# Patient Record
Sex: MALE | Race: AMERICAN INDIAN OR ALASKA NATIVE
[De-identification: names, ages, dates, MRNs, and addresses within clinical notes are randomized per-mention and may not be internally consistent; named-entity substitution may affect disease eponyms.]

---

## 2020-01-14 LAB
ALBUMIN SERPL-MCNC: 4.2 G/DL (ref 3.9–5)
ALT SERPL-CCNC: 21 UNITS/L (ref 7–56)
BASOPHILS # (AUTO): 0.1 K/MM3 (ref 0–0.1)
BASOPHILS NFR BLD AUTO: 0.9 % (ref 0–1.8)
BUN SERPL-MCNC: 10 MG/DL (ref 9–20)
BUN/CREAT SERPL: 10 %
CALCIUM SERPL-MCNC: 9.3 MG/DL (ref 8.4–10.2)
EOSINOPHIL # BLD AUTO: 0.2 K/MM3 (ref 0–0.4)
EOSINOPHIL NFR BLD AUTO: 3.3 % (ref 0–4.3)
HCT VFR BLD CALC: 43.1 % (ref 35.5–45.6)
HEMOLYSIS INDEX: 14
HGB BLD-MCNC: 14.1 GM/DL (ref 11.8–15.2)
LYMPHOCYTES # BLD AUTO: 2.1 K/MM3 (ref 1.2–5.4)
LYMPHOCYTES NFR BLD AUTO: 28.8 % (ref 13.4–35)
MCHC RBC AUTO-ENTMCNC: 33 % (ref 32–34)
MCV RBC AUTO: 80 FL (ref 84–94)
MONOCYTES # (AUTO): 0.7 K/MM3 (ref 0–0.8)
MONOCYTES % (AUTO): 9.3 % (ref 0–7.3)
PLATELET # BLD: 229 K/MM3 (ref 140–440)
RBC # BLD AUTO: 5.37 M/MM3 (ref 3.65–5.03)

## 2020-01-14 NOTE — ANESTHESIA CONSULTATION
Anesthesia Consult and Med Hx


Date of service: 01/15/20





- Airway


Anesthetic Teeth Evaluation: Chipped


ROM Head & Neck: Adequate


Mental/Hyoid Distance: Adequate


Mallampati Class: Class II


Intubation Access Assessment: Probably Good





- Pre-Operative Health Status


ASA Pre-Surgery Classification: ASA3


Proposed Anesthetic Plan: General





- Pulmonary


Hx Smoking: Yes (1/3 PPD X 30 YRS)


Hx Sleep Apnea: Yes (DX SLEEP APNEA WITH CPAP USE.)





- Cardiovascular System


Hx Hypertension: Yes (X 12 YRS. States he can climb two flights of stairs)





- Central Nervous System


Hx Neuromuscular Disorder: Yes (LUE neuropathy)





- Endocrine


Hx Non-Insulin Dependent Diabetes: Yes





- Other Systems


Hx Cancer: No

## 2020-01-15 ENCOUNTER — HOSPITAL ENCOUNTER (OUTPATIENT)
Dept: HOSPITAL 5 - OR | Age: 50
Setting detail: OBSERVATION
LOS: 1 days | Discharge: HOME | End: 2020-01-16
Attending: UROLOGY | Admitting: UROLOGY
Payer: COMMERCIAL

## 2020-01-15 DIAGNOSIS — N52.9: Primary | ICD-10-CM

## 2020-01-15 PROCEDURE — 55175 REVISION OF SCROTUM: CPT

## 2020-01-15 PROCEDURE — C1813 PROSTHESIS, PENILE, INFLATAB: HCPCS

## 2020-01-15 PROCEDURE — 36415 COLL VENOUS BLD VENIPUNCTURE: CPT

## 2020-01-15 PROCEDURE — 82962 GLUCOSE BLOOD TEST: CPT

## 2020-01-15 PROCEDURE — G0378 HOSPITAL OBSERVATION PER HR: HCPCS

## 2020-01-15 PROCEDURE — 88302 TISSUE EXAM BY PATHOLOGIST: CPT

## 2020-01-15 PROCEDURE — 96375 TX/PRO/DX INJ NEW DRUG ADDON: CPT

## 2020-01-15 PROCEDURE — 96376 TX/PRO/DX INJ SAME DRUG ADON: CPT

## 2020-01-15 PROCEDURE — 88305 TISSUE EXAM BY PATHOLOGIST: CPT

## 2020-01-15 PROCEDURE — 54401 INSERT SELF-CONTD PROSTHESIS: CPT

## 2020-01-15 PROCEDURE — 85025 COMPLETE CBC W/AUTO DIFF WBC: CPT

## 2020-01-15 PROCEDURE — 80053 COMPREHEN METABOLIC PANEL: CPT

## 2020-01-15 PROCEDURE — 96367 TX/PROPH/DG ADDL SEQ IV INF: CPT

## 2020-01-15 PROCEDURE — 96366 THER/PROPH/DIAG IV INF ADDON: CPT

## 2020-01-15 PROCEDURE — 96365 THER/PROPH/DIAG IV INF INIT: CPT

## 2020-01-15 RX ADMIN — CEFAZOLIN SCH MLS/HR: 330 INJECTION, POWDER, FOR SOLUTION INTRAMUSCULAR; INTRAVENOUS at 22:10

## 2020-01-15 RX ADMIN — HYDROMORPHONE HYDROCHLORIDE PRN MG: 2 INJECTION, SOLUTION INTRAMUSCULAR; INTRAVENOUS; SUBCUTANEOUS at 23:08

## 2020-01-15 RX ADMIN — HYDROMORPHONE HYDROCHLORIDE PRN MG: 2 INJECTION, SOLUTION INTRAMUSCULAR; INTRAVENOUS; SUBCUTANEOUS at 19:22

## 2020-01-15 RX ADMIN — INSULIN HUMAN SCH: 100 INJECTION, SOLUTION PARENTERAL at 22:11

## 2020-01-15 RX ADMIN — INSULIN HUMAN SCH: 100 INJECTION, SOLUTION PARENTERAL at 18:05

## 2020-01-15 NOTE — OPERATIVE REPORT
PREOPERATIVE DIAGNOSIS:  Erectile dysfunction.



POSTOPERATIVE DIAGNOSES:  Erectile dysfunction, redundant scrotal skin.



PROCEDURE:  Insertion of inflatable penile prosthesis (AMS-LGX 24 cm, +3 cm rear

tip extender), scrotoplasty, and intracorporeal injection of pharmacologic

agent.



SURGEON:  Bong Gonzalez MD



ASSISTANT:  Minerva Espinoza.



ANESTHESIA:  General.



ESTIMATED BLOOD LOSS:  Minimal.



FLUIDS:  Crystalloid.



COMPLICATIONS:  No complications.



INDICATIONS:  This patient is a 49-year-old gentleman known to my office for

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  He has tried medications and progressed

to a vacuum erection device.  We discussed options with the patient and his

wife.  They agreed to proceed with the surgical intervention.  Risks, benefits,

and complications were explained.  They reviewed the surgical video.



DESCRIPTION OF PROCEDURE:  The patient was taken to the operative suite and

placed in a supine position.  After adequate general anesthesia, he was prepped

and draped in a sterile fashion.  Celestin catheter was placed on the operative

field.  Minerva Espinoza was present throughout the case at the bedside to assist

with surgical dissection.  Celestin catheter was placed.  Metal [place] retractor

was used.  0.25% Marcaine was injected into the penile shaft to aid with the

postoperative pain.  No significant curvature.  He did have some mild curvature

to the left.  No obvious plaque could be appreciated.  Transscrotal incision was

made with a Bovie.  Sharp dissection was taken down to the corporal bodies. 

Stays were placed on the retractor.  The 2-0 Vicryl stay sutures were placed

into the corporal bodies.  Corporotomies were made bilaterally.  Gentle dilation

of the corporal bodies were performed.  Total measurement was 27 cm and

therefore a 24 cm AMS-LGX device was prepped as well as 3 cm rear tip extenders.

 Copious irrigation was performed throughout the procedure.  Adequate hemostasis

was achieved.  A 100 mL reservoir was placed in the retropubic space via the

right external ring and 100 mL of saline was placed.  With the aid of a Sumeet

needle, the cylinders were placed in the corporal bodies without difficulty. 

Adequate seating could be appreciated.  A running 2-0 Vicryl stitch was placed

bilaterally.  Testing of the cylinders revealed an excellent response.  They

held approximately 80 mL of saline.  They were connected to the quick click

connection system.  A total of 150 mL of saline was left in the system, 100 mL

in the reservoir, and 50 in these cylinders.  Insufflation was performed again

and adequate cosmetic appearance could be appreciated.  The pump was placed in

the dependent portion of the scrotum.  Pursestring suture using 2-0 Vicryl was

used to secure dependently and 2-0 Vicryl was used as a running stitch to secure

the dartos layer.  Redundant scrotal skin was excised.  A 3-0 Vicryl was used to

close the skin in an interrupted fashion, Xeroform gauze followed by mummy wrap.

 The patient tolerated the procedure well and was extubated and taken to the

recovery room in a stable condition.





DD: 01/15/2020 16:24

DT: 01/15/2020 16:59

JOB# [id number]  [id number]

NYDIA/DALTON

## 2020-01-15 NOTE — SHORT STAY SUMMARY
Short Stay Documentation


Date of service: 01/15/20





- History


H&P: obtained from office





- Allergies and Medications


Current Medications: 


                                    Allergies





No Known Allergies Allergy (Verified 01/13/20 11:07)


   





                                Home Medications











 Medication  Instructions  Recorded  Confirmed  Last Taken  Type


 


Bc Powder 1 dose PO PRN PRN 01/13/20 01/11/20 08:00 History


 


Cholecalciferol (Vitamin D3) 50,000 unit PO QWEEK 01/13/20 01/15/20 01/14/20 

08:00 History





[Vitamin D3 50,000UNIT CAP]     


 


Gabapentin [Neurontin] 600 mg PO DAILY 01/13/20 01/15/20 01/15/20 08:00 History


 


Gemfibrozil [Lopid] 600 mg PO DAILY 01/13/20 01/15/20 01/15/20 08:00 History


 


Glimepiride [Amaryl] 2 mg PO DAILY 01/13/20 01/15/20 01/15/20 08:00 History


 


Lisinopril/Hydrochlorothiazide 1 each PO DAILY 01/13/20 01/15/20 01/15/20 08:00 

History





[Zestoretic 10-12.5 mg Tablet]     


 


Loratadine [Allergy Relief] 10 mg PO DAILY 01/13/20 01/15/20 01/15/20 08:00 

History


 


Metformin HCl [metFORMIN] 1,000 mg PO DAILY 01/13/20 01/15/20 01/15/20 08:00 

History


 


Naproxen Sodium [Aleve] 220 mg PO DAILY 01/13/20 01/15/20 01/11/20 08:00 History


 


Sulfamethoxazole/Trimethoprim 1 each PO BID 01/13/20 01/15/20 01/15/20 08:00 

History





[Sulfamethoxazole-Tmp Ds Tablet]     








Active Medications





Acetaminophen (Tylenol)  1,000 mg PO ONCE VIRGIL


   Stop: 01/15/20 23:00


   Last Admin: 01/15/20 12:35 Dose:  1,000 mg


   Documented by: 


Gabapentin (Gabapentin)  600 mg PO PREOP NR


   Stop: 01/15/20 23:00


   Last Admin: 01/15/20 12:35 Dose:  300 mg


   Documented by: 


Hydromorphone HCl (Dilaudid)  0.5 mg IV Q10MIN PRN


   PRN Reason: Pain , Severe (7-10)


   Stop: 01/15/20 23:00


Vancomycin HCl (Vancomycin/Ns 1 Gm/250 Ml)  1 gm in 250 mls @ 166.667 mls/hr IV 

PREOP NR; Protocol


   Stop: 01/15/20 23:00


   Last Admin: 01/15/20 12:55 Dose:  166.667 mls/hr


   Documented by: 


Lactated Ringer's (Lactated Ringers)  1,000 mls @ 125 mls/hr IV AS DIRECT VIRGIL


   Last Admin: 01/15/20 12:30 Dose:  125 mls/hr


   Documented by: 


Magnesium Oxide (Mag-Ox)  400 mg PO ONCE VIRGIL


   Stop: 01/15/20 23:00


   Last Admin: 01/15/20 12:35 Dose:  400 mg


   Documented by: 


Ondansetron HCl (Zofran)  4 mg IV ONCE PRN


   PRN Reason: Nausea And Vomiting











- Brief post op/procedure progress note


Date of procedure: 01/15/20


Pre-op diagnosis: ed


Post-op diagnosis: other (redundant scrotal skin)


Surgeon: SUZANNE OTERO


Estimated blood loss: 50-100ml


Pathology: list (scrotal skin)


Specimen disposition: to lab


Condition: stable





- Hospital course


Hospital course: 





pt has bactrim,dilaudid, post op info





- Disposition


Condition at discharge: Stable


Disposition: DC-01 TO HOME OR SELFCARE





Short Stay Discharge Plan


Follow up with: 


AZRA MCCALLUM DO [Primary Care Provider] - 7 Days

## 2020-01-16 VITALS — DIASTOLIC BLOOD PRESSURE: 83 MMHG | SYSTOLIC BLOOD PRESSURE: 132 MMHG

## 2020-01-16 RX ADMIN — HYDROMORPHONE HYDROCHLORIDE PRN MG: 2 INJECTION, SOLUTION INTRAMUSCULAR; INTRAVENOUS; SUBCUTANEOUS at 09:21

## 2020-01-16 RX ADMIN — CEFAZOLIN SCH MLS/HR: 330 INJECTION, POWDER, FOR SOLUTION INTRAMUSCULAR; INTRAVENOUS at 05:33

## 2020-01-16 RX ADMIN — INSULIN HUMAN SCH: 100 INJECTION, SOLUTION PARENTERAL at 09:07

## 2020-01-16 RX ADMIN — HYDROMORPHONE HYDROCHLORIDE PRN MG: 2 INJECTION, SOLUTION INTRAMUSCULAR; INTRAVENOUS; SUBCUTANEOUS at 04:01

## 2020-01-16 NOTE — DISCHARGE SUMMARY
Short Stay Discharge Plan


Activity: other (no sex no straining )


Weight Bearing Status: Full Weight Bearing


Diet: low fat, low cholesterol, low salt, diabetic


Wound: open to air


Special Instructions: other (ice packs today )


Durable Medical Equipment Needed Upon Discharge: other (d/c marquise )


Follow up with: 


AZAR MCCALLUM DO [Primary Care Provider] - 7 Days


SUZANNE OTERO MD [Staff Physician] - 7 Days

## 2020-01-16 NOTE — CONSULTATION
History of Present Illness





- Reason for Consult


Consult date: 01/15/20


Medical management


Requesting physician: SUZANNE OTERO





- History of Present Illness


S/p IPP.Postop doing well.No complications.Family at bedside.








Past History


Past Medical History: diabetes, hypertension, hyperlipidemia, other (Sleep 

Apnea,Vit D deficiency)


Past Surgical History: Other (IPP)


Social history: , lives with family, full code


Family history: hypertension





Medications and Allergies


                                    Allergies











Allergy/AdvReac Type Severity Reaction Status Date / Time


 


No Known Allergies Allergy   Verified 01/13/20 11:07











                                Home Medications











 Medication  Instructions  Recorded  Confirmed  Last Taken  Type


 


Bc Powder 1 dose PO PRN PRN 01/13/20 01/11/20 08:00 History


 


Cholecalciferol (Vitamin D3) 50,000 unit PO QWEEK 01/13/20 01/15/20 01/14/20 

08:00 History





[Vitamin D3 50,000UNIT CAP]     


 


Gabapentin [Neurontin] 600 mg PO DAILY 01/13/20 01/15/20 01/15/20 08:00 History


 


Gemfibrozil [Lopid] 600 mg PO DAILY 01/13/20 01/15/20 01/15/20 08:00 History


 


Glimepiride [Amaryl] 2 mg PO DAILY 01/13/20 01/15/20 01/15/20 08:00 History


 


Lisinopril/Hydrochlorothiazide 1 each PO DAILY 01/13/20 01/15/20 01/15/20 08:00 

History





[Zestoretic 10-12.5 mg Tablet]     


 


Loratadine [Allergy Relief] 10 mg PO DAILY 01/13/20 01/15/20 01/15/20 08:00 

History


 


Metformin HCl [metFORMIN] 1,000 mg PO DAILY 01/13/20 01/15/20 01/15/20 08:00 

History


 


Naproxen Sodium [Aleve] 220 mg PO DAILY 01/13/20 01/15/20 01/11/20 08:00 History


 


Sulfamethoxazole/Trimethoprim 1 each PO BID 01/13/20 01/15/20 01/15/20 08:00 

History





[Sulfamethoxazole-Tmp Ds Tablet]     











Active Meds: 


Active Medications





Acetaminophen (Tylenol)  650 mg PO Q4H PRN


   PRN Reason: Pain MILD(1-3)/Fever >100.5/HA


Cetirizine HCl (Cetirizine)  10 mg PO DAILY [facility]


Dextrose (D50w (25gm) Syringe)  50 ml IV Q30MIN PRN; Protocol


   PRN Reason: Hypoglycemia


Ergocalciferol (Vitamin D2)  50,000 unit PO Tu [facility]


Gabapentin (Gabapentin)  600 mg PO QDAY [facility]


Gemfibrozil (Lopid)  600 mg PO DAILY [facility]


Glimepiride (Amaryl)  2 mg PO DAILY [facility]


Hydrochlorothiazide (Hctz)  12.5 mg PO QDAY [facility]


Hydromorphone HCl (Dilaudid)  2 mg IV Q3H PRN


   PRN Reason: Pain , Severe (7-10)


   Last Admin: 01/16/20 04:01 Dose:  2 mg


   Documented by: 


Hydromorphone HCl (Dilaudid)  2 mg PO Q4H PRN


   PRN Reason: Pain , Severe (7-10)


Lactated Ringer's (Lactated Ringers)  1,000 mls @ 125 mls/hr IV AS DIRECT VIRGIL


   Last Admin: 01/15/20 12:30 Dose:  125 mls/hr


   Documented by: 


Sodium Chloride (Nacl 0.45% 1000 Ml)  1,000 mls @ 125 mls/hr IV AS DIRECT VIRGIL


   Last Admin: 01/15/20 22:10 Dose:  125 mls/hr


   Documented by: 


Cefazolin Sodium (Ancef/Ns 1 Gm/50 Ml)  1 gm in 50 mls @ 100 mls/hr IV Q8H [facility]; 

Protocol


   Last Admin: 01/15/20 22:10 Dose:  100 mls/hr


   Documented by: 


Insulin Human Regular (Humulin R)  0 units SUB-Q ACHS [facility]; Protocol


   Last Admin: 01/15/20 22:11 Dose:  Not Given


   Documented by: 


Lisinopril (Zestril)  10 mg PO QDAY [facility]


Metformin HCl (Glucophage)  1,000 mg PO QDAY [facility]


Naloxone HCl (Naloxone)  0.1 mg IV Q2MIN PRN


   PRN Reason: Res Rate </= 8 or 02 SAT < 92%


Ondansetron HCl (Zofran)  4 mg IV ONCE PRN


   PRN Reason: Nausea And Vomiting


Ondansetron HCl (Zofran)  4 mg IV Q8H PRN


   PRN Reason: Nausea And Vomiting


Sodium Chloride (Sodium Chloride Flush Syringe 10 Ml)  10 ml IV BID [facility]


   Last Admin: 01/15/20 22:11 Dose:  10 ml


   Documented by: 


Sodium Chloride (Sodium Chloride Flush Syringe 10 Ml)  10 ml IV PRN PRN


   PRN Reason: LINE FLUSH


Zolpidem Tartrate (Ambien)  5 mg PO QHS PRN


   PRN Reason: Insomnia











Review of Systems


All systems: negative





Exam





- Constitutional


Vitals: 


                                        











Temp Pulse Resp BP Pulse Ox


 


 98.1 F   96 H  20   123/68   98 


 


 01/16/20 03:53  01/16/20 03:53  01/16/20 04:01  01/16/20 03:53  01/16/20 03:53











General appearance: Present: no acute distress, well-nourished





- EENT


Eyes: Present: PERRL


ENT: hearing intact, clear oral mucosa





- Neck


Neck: Present: supple, normal ROM





- Respiratory


Respiratory effort: normal


Respiratory: bilateral: CTA





- Cardiovascular


Heart rate: 76


Rhythm: regular


Heart Sounds: Present: S1 & S2.  Absent: rub, click





- Extremities


Extremities: no ischemia, pulses intact, pulses symmetrical, No edema


Peripheral Pulses: within normal limits





- Abdominal


General gastrointestinal: Present: soft, non-tender, non-distended, normal bowel

sounds


Male genitourinary: Present: normal





- Rectal


Rectal Exam: deferred





- Integumentary


Integumentary: Present: clear, warm, dry





- Musculoskeletal


Musculoskeletal: gait normal, strength equal bilaterally





- Psychiatric


Psychiatric: appropriate mood/affect, intact judgment & insight





- Neurologic


Neurologic: CNII-XII intact, moves all extremities





- Allied Health


Allied health notes reviewed: nursing, case management





Results





- Labs


CBC & Chem 7: 


                                 01/14/20 11:30





                                 01/14/20 11:30


Labs: 


                              Abnormal lab results











  01/15/20 01/15/20 01/15/20 Range/Units





  12:29 16:35 21:24 


 


POC Glucose  124 H  111 H  191 H  ()  














Assessment and Plan





- Patient Problems


(1) Status post surgery


Current Visit: Yes   Status: Acute   


Plan to address problem: 


Patient had IPP.


Post op doing well.








(2) HTN (hypertension)


Current Visit: Yes   Status: Chronic   


Qualifiers: 


   Hypertension type: essential hypertension   Qualified Code(s): I10 - 

Essential (primary) hypertension   


Plan to address problem: 


Cont anti hypertensives.








(3) T2DM (type 2 diabetes mellitus)


Current Visit: Yes   Status: Chronic   


Qualifiers: 


   Diabetes mellitus long term insulin use: without long term use 


Plan to address problem: 


COnt oral hypoglycemics and coverage.


Check A1c.








(4) HLD (hyperlipidemia)


Current Visit: Yes   Status: Chronic   


Qualifiers: 


   Hyperlipidemia type: mixed hyperlipidemia   Qualified Code(s): E78.2 - Mixed 

hyperlipidemia   


Plan to address problem: 


Cont Gemfibrozil








(5) Vitamin D deficiency


Current Visit: Yes   Status: Chronic   


Plan to address problem: 


Cont Vitamin D








(6) Sleep apnea


Current Visit: Yes   Status: Chronic   


Qualifiers: 


   Sleep apnea type: obstructive   Qualified Code(s): G47.33 - Obstructive sleep

apnea (adult) (pediatric)   


Plan to address problem: 


Cont CPAP prn








(7) DVT prophylaxis


Current Visit: Yes   Status: Acute   


Plan to address problem: 


On SCD's and GI prophylaxis

## 2020-01-16 NOTE — PROGRESS NOTE
Assessment and Plan





min swelling 


no eccymosis 


ipp symetrical 


dression removed 





Subjective


Date of service: 01/16/20





Objective





- Constitutional


Vitals: 


                               Vital Signs - 12hr











  01/15/20 01/16/20 01/16/20





  23:47 03:53 04:01


 


Temperature 97.6 F 98.1 F 


 


Pulse Rate 91 H 96 H 


 


Respiratory 20 20 20





Rate   


 


Blood Pressure 124/72 123/68 


 


O2 Sat by Pulse 92 98 





Oximetry   














  01/16/20 01/16/20 01/16/20





  04:20 08:18 09:16


 


Temperature  98.0 F 


 


Pulse Rate  106 H 106 H


 


Respiratory 18 20 





Rate   


 


Blood Pressure  147/79 147/79


 


O2 Sat by Pulse  100 





Oximetry   











General appearance: Present: no acute distress





- Neck


Neck: supple





- Respiratory


Respiratory effort: normal


Extremities: no ischemia





- Gastrointestinal


General gastrointestinal: Present: soft, non-tender





- Genitourinary


Male genitourinary: scrotal edema





- Labs


CBC & Chem 7: 


                                 01/14/20 11:30





                                 01/14/20 11:30


Labs: 


                              Abnormal lab results











  01/15/20 01/15/20 01/15/20 Range/Units





  12:29 16:35 21:24 


 


POC Glucose  124 H  111 H  191 H  ()  














  01/16/20 Range/Units





  09:08 


 


POC Glucose  316 H  ()  














Medications & Allergies





- Medications


Allergies/Adverse Reactions: 


                                    Allergies





No Known Allergies Allergy (Verified 01/13/20 11:07)


   








Home Medications: 


                                Home Medications











 Medication  Instructions  Recorded  Confirmed  Last Taken  Type


 


Bc Powder 1 dose PO PRN PRN 01/13/20 01/16/20 01/11/20 08:00 History


 


Cholecalciferol (Vitamin D3) 50,000 unit PO QWEEK 01/13/20 01/15/20 01/14/20 

08:00 History





[Vitamin D3 50,000UNIT CAP]     


 


Gabapentin [Neurontin] 600 mg PO DAILY 01/13/20 01/15/20 01/15/20 08:00 History


 


Gemfibrozil [Lopid] 600 mg PO DAILY 01/13/20 01/15/20 01/15/20 08:00 History


 


Glimepiride [Amaryl] 2 mg PO DAILY 01/13/20 01/15/20 01/15/20 08:00 History


 


Lisinopril/Hydrochlorothiazide 1 each PO DAILY 01/13/20 01/15/20 01/15/20 08:00 

History





[Zestoretic 10-12.5 mg Tablet]     


 


Loratadine [Allergy Relief] 10 mg PO DAILY 01/13/20 01/15/20 01/15/20 08:00 

History


 


Metformin HCl [metFORMIN] 1,000 mg PO DAILY 01/13/20 01/15/20 01/15/20 08:00 

History


 


Naproxen Sodium [Aleve] 220 mg PO DAILY 01/13/20 01/15/20 01/11/20 08:00 History


 


Sulfamethoxazole/Trimethoprim 1 each PO BID 01/13/20 01/15/20 01/15/20 08:00 

History





[Sulfamethoxazole-Tmp Ds Tablet]     











Active Medications: 














Generic Name Dose Route Start Last Admin





  Trade Name Freq  PRN Reason Stop Dose Admin


 


Acetaminophen  650 mg  01/15/20 15:19 





  Tylenol  PO  





  Q4H PRN  





  Pain MILD(1-3)/Fever >100.5/HA  


 


Cetirizine HCl  10 mg  01/16/20 10:00  01/16/20 09:16





  Cetirizine  PO   10 mg





  DAILY VIRGIL   Administration


 


Dextrose  50 ml  01/15/20 15:26 





  D50w (25gm) Syringe  IV  





  Q30MIN PRN  





  Hypoglycemia  





  Protocol  


 


Ergocalciferol  50,000 unit  01/21/20 10:00 





  Vitamin D2  PO  





  Tu [facility]  


 


Gabapentin  600 mg  01/16/20 10:00  01/16/20 09:17





  Gabapentin  PO   600 mg





  QDAY VIRGIL   Administration


 


Gemfibrozil  600 mg  01/16/20 10:00 





  Lopid  PO  





  DAILY [facility]  


 


Glimepiride  2 mg  01/16/20 10:00 





  Amaryl  PO  





  DAILY [facility]  


 


Hydrochlorothiazide  12.5 mg  01/16/20 10:00  01/16/20 09:16





  Hctz  PO   12.5 mg





  QDAY VIRGIL   Administration


 


Hydromorphone HCl  2 mg  01/15/20 15:24  01/16/20 09:21





  Dilaudid  IV   2 mg





  Q3H PRN   Administration





  Pain , Severe (7-10)  


 


Hydromorphone HCl  2 mg  01/15/20 15:25 





  Dilaudid  PO  





  Q4H PRN  





  Pain , Severe (7-10)  


 


Lactated Ringer's  1,000 mls @ 125 mls/hr  01/15/20 12:00  01/15/20 12:30





  Lactated Ringers  IV   125 mls/hr





  AS DIRECT VIRGIL   Administration


 


Sodium Chloride  1,000 mls @ 125 mls/hr  01/15/20 17:00  01/15/20 22:10





  Nacl 0.45% 1000 Ml  IV   125 mls/hr





  AS DIRECT VIRGIL   Administration


 


Cefazolin Sodium  1 gm in 50 mls @ 100 mls/hr  01/15/20 22:00  01/16/20 05:33





  Ancef/Ns 1 Gm/50 Ml  IV   100 mls/hr





  Q8H VIRGIL   Administration





  Protocol  


 


Insulin Human Lispro  0 unit  01/16/20 07:30  01/16/20 09:07





  Humalog  SUB-Q   Not Given





  [facility] VIRGIL  





  Protocol  


 


Insulin Human Regular  0 units  01/15/20 16:30  01/16/20 09:07





  Humulin R  SUB-Q   Not Given





  [facility] VIRGIL  





  Protocol  


 


Lisinopril  10 mg  01/16/20 10:00  01/16/20 09:16





  Zestril  PO   10 mg





  QDAY VIRGIL   Administration


 


Metformin HCl  1,000 mg  01/16/20 10:00  01/16/20 09:17





  Glucophage  PO   1,000 mg





  QDAY VIRGIL   Administration


 


Naloxone HCl  0.1 mg  01/15/20 15:19 





  Naloxone  IV  





  Q2MIN PRN  





  Res Rate </= 8 or 02 SAT < 92%  


 


Ondansetron HCl  4 mg  01/15/20 11:49 





  Zofran  IV  





  ONCE PRN  





  Nausea And Vomiting  


 


Ondansetron HCl  4 mg  01/15/20 15:19 





  Zofran  IV  





  Q8H PRN  





  Nausea And Vomiting  


 


Sodium Chloride  10 ml  01/15/20 22:00  01/15/20 22:11





  Sodium Chloride Flush Syringe 10 Ml  IV   10 ml





  BID VIRGIL   Administration


 


Sodium Chloride  10 ml  01/15/20 15:19 





  Sodium Chloride Flush Syringe 10 Ml  IV  





  PRN PRN  





  LINE FLUSH  


 


Zolpidem Tartrate  5 mg  01/15/20 15:19 





  Ambien  PO  





  QHS PRN  





  Insomnia